# Patient Record
Sex: FEMALE | HISPANIC OR LATINO
[De-identification: names, ages, dates, MRNs, and addresses within clinical notes are randomized per-mention and may not be internally consistent; named-entity substitution may affect disease eponyms.]

---

## 2017-01-19 ENCOUNTER — APPOINTMENT (OUTPATIENT)
Dept: OBGYN | Facility: CLINIC | Age: 42
End: 2017-01-19

## 2017-01-19 DIAGNOSIS — R32 UNSPECIFIED URINARY INCONTINENCE: ICD-10-CM

## 2017-01-19 RX ORDER — OXYBUTYNIN CHLORIDE 5 MG/1
5 TABLET ORAL
Qty: 1 | Refills: 6 | Status: ACTIVE | COMMUNITY
Start: 2017-01-19 | End: 1900-01-01

## 2017-03-16 ENCOUNTER — APPOINTMENT (OUTPATIENT)
Dept: OBGYN | Facility: CLINIC | Age: 42
End: 2017-03-16

## 2023-07-25 ENCOUNTER — NON-APPOINTMENT (OUTPATIENT)
Age: 48
End: 2023-07-25

## 2023-07-28 ENCOUNTER — NON-APPOINTMENT (OUTPATIENT)
Age: 48
End: 2023-07-28

## 2023-08-03 ENCOUNTER — APPOINTMENT (OUTPATIENT)
Dept: ORTHOPEDIC SURGERY | Facility: CLINIC | Age: 48
End: 2023-08-03
Payer: COMMERCIAL

## 2023-08-03 VITALS
WEIGHT: 160 LBS | DIASTOLIC BLOOD PRESSURE: 72 MMHG | HEIGHT: 64 IN | HEART RATE: 83 BPM | BODY MASS INDEX: 27.31 KG/M2 | SYSTOLIC BLOOD PRESSURE: 110 MMHG

## 2023-08-03 DIAGNOSIS — M79.642 PAIN IN LEFT HAND: ICD-10-CM

## 2023-08-03 DIAGNOSIS — Z78.9 OTHER SPECIFIED HEALTH STATUS: ICD-10-CM

## 2023-08-03 PROCEDURE — 99203 OFFICE O/P NEW LOW 30 MIN: CPT

## 2023-08-04 PROBLEM — Z78.9 CURRENT NON-SMOKER: Status: ACTIVE | Noted: 2023-08-04

## 2023-08-04 NOTE — HISTORY OF PRESENT ILLNESS
[FreeTextEntry1] : Patient is 46yo RHD female , [facility], Dental Dept, who presents as a NEW PATIENT with "left hand pain". Patient reports that she saw hand surgeon, Alex Castellano, hand surgeon, orthopedist. Patient reports that she has had imaging performed. Patient states that she saw Dr. Castellano a number of times beginning in February 2023. Patient states that she was given a cortisone shot in the left palm possibly long finger flexor sheath and A1 pulley. Patient states that symptoms were improved for 1 week only and then symptoms recurred. Patient not aware of triggering at any time. Pt reports numbness and tingling in left hand, usually thumb and little. Initially numbness and tingling was long and ring fingers. Patient perceives distal forearm pain and swelling in the morning. Pt had NCS/EMG that "came back nothing", "normal".   Patient saw Sophia Del Real MD. Patient was placed on gabapentin which tried for 1 month.  Because it was not helpful, patient stopped the gabapentin. Patient had seen chiropractor on 4 occasions and was not improved. Patient had COVID 2020.  Patient had no sequela. Patient is not aware of having Lyme disease. Patient states that she is borderline diabetic but otherwise she is not aware of any medical disorders. Patient not yet seen rheumatologist.  Over the past 3 weeks the patient had more finger swelling primarily in thumb and little finger and primary care physician placed patient on antibiotics.

## 2023-08-04 NOTE — PHYSICAL EXAM
[de-identified] : MSK exam: Neck range of motion full without radiation. Bilateral shoulder and bilateral elbow motion full without localizing findings. Left wrist: E/F50/50. Patient perceives slight pain FCR and dorsal radially at radius scaphoid interval but not notably elsewhere. Patient describes radiating pain proximally into the forearm. Basal joint 3+ laxity no pain or crepitus Left hand: Tenderness left thumb and left little finger A1 pulleys Patient has pain attempting to flex left thumb and left little finger. Patient perceives swelling in these 2 digits. No notable tenderness index, long, ring finger A1 pulleys Full flexion of these digits without pain No notable swelling or MP or IP joints.  Right wrist Full motion no localizing findings. Right hand No localizing findings No A1 pulley tenderness and no triggering in any finger. No pertinent MP, PIP, or DIP joint contributory findings.  Neurologic Patient perceives pain at rest throughout the palm into the thumb and little finger Phalen's test with median nerve compression: Left hand: Patient perceives paresthesias in ring and little fingers not median distribution Right hand normal sensation negative Verónica Test thing Muscle strength intact  Skin: No cyanosis, clubbing, edema or rashes. Vascular: Radial pulses intact. Lymphatic: No streaking or epitrochlear adenopathy. The patient is awake, alert, and oriented. Affect appropriate. Cooperative.  [de-identified] : X-rays deferred.

## 2023-10-02 ENCOUNTER — APPOINTMENT (OUTPATIENT)
Dept: RHEUMATOLOGY | Facility: CLINIC | Age: 48
End: 2023-10-02